# Patient Record
Sex: FEMALE | Race: WHITE | NOT HISPANIC OR LATINO | Employment: FULL TIME | URBAN - METROPOLITAN AREA
[De-identification: names, ages, dates, MRNs, and addresses within clinical notes are randomized per-mention and may not be internally consistent; named-entity substitution may affect disease eponyms.]

---

## 2018-11-27 ENCOUNTER — TRANSFERRED RECORDS (OUTPATIENT)
Dept: HEALTH INFORMATION MANAGEMENT | Facility: CLINIC | Age: 58
End: 2018-11-27

## 2018-11-29 ENCOUNTER — PRE VISIT (OUTPATIENT)
Dept: OPHTHALMOLOGY | Facility: CLINIC | Age: 58
End: 2018-11-29

## 2018-11-29 PROBLEM — N95.2 VAGINAL ATROPHY: Status: ACTIVE | Noted: 2017-10-29

## 2018-11-29 PROBLEM — L30.9 DERMATITIS: Status: ACTIVE | Noted: 2017-10-29

## 2018-11-29 PROBLEM — N95.1 MENOPAUSAL STATE: Status: ACTIVE | Noted: 2017-10-29

## 2018-11-29 NOTE — TELEPHONE ENCOUNTER
I have attempted to contact this patient by phone with the following results:   Chief Complaint   Patient presents with     Previsit     appt w/ Dr Chen     Droopy Right Upper Lid     worsening over the last few years, also worsens as day wears on     Tawny W. COA. OSC

## 2018-12-04 NOTE — TELEPHONE ENCOUNTER
December 4, 2018                 Pre-Visit Documentation: Jane Fowler is a 58 year old female    Chief Complaint   Patient presents with     Previsit     appt w/ Dr Chen     Droopy Right Upper Lid     worsening over the last few years, also worsens as day wears on           No current outpatient prescriptions on file.       Tawny CALVILLO COA. [facility]SC

## 2021-03-13 ENCOUNTER — HEALTH MAINTENANCE LETTER (OUTPATIENT)
Age: 61
End: 2021-03-13

## 2021-10-23 ENCOUNTER — HEALTH MAINTENANCE LETTER (OUTPATIENT)
Age: 61
End: 2021-10-23

## 2022-04-09 ENCOUNTER — HEALTH MAINTENANCE LETTER (OUTPATIENT)
Age: 62
End: 2022-04-09

## 2022-10-09 ENCOUNTER — HEALTH MAINTENANCE LETTER (OUTPATIENT)
Age: 62
End: 2022-10-09

## 2023-03-25 ENCOUNTER — HEALTH MAINTENANCE LETTER (OUTPATIENT)
Age: 63
End: 2023-03-25

## 2023-05-21 ENCOUNTER — HEALTH MAINTENANCE LETTER (OUTPATIENT)
Age: 63
End: 2023-05-21

## 2023-12-14 ENCOUNTER — TRANSCRIBE ORDERS (OUTPATIENT)
Dept: OTHER | Age: 63
End: 2023-12-14

## 2023-12-14 ENCOUNTER — PRE VISIT (OUTPATIENT)
Dept: ONCOLOGY | Facility: CLINIC | Age: 63
End: 2023-12-14
Payer: COMMERCIAL

## 2023-12-14 DIAGNOSIS — C50.919 BREAST CANCER (H): Primary | ICD-10-CM

## 2023-12-14 NOTE — TELEPHONE ENCOUNTER
Action Taken  Date/Description  TJ     WT from NPS December 14, 2023  4:00 PM   Call from Pt to schedule 2nd opinion for Dx Breast cancer (H) [C50.919]   Pt was Diagnosed about 1 1/2 months ago - in [facility] Breast, Lymphnodes and Spine  Records from [facility] are in [facility]  All imaging at [facility]   Bx at  [facility] ([facility])

## 2023-12-15 ENCOUNTER — PATIENT OUTREACH (OUTPATIENT)
Dept: ONCOLOGY | Facility: CLINIC | Age: 63
End: 2023-12-15
Payer: COMMERCIAL

## 2023-12-15 NOTE — PROGRESS NOTES
New Patient Oncology Nurse Navigator Note     Referring provider: Self Referral      Referring Clinic/Organization: [facility]     Referred to (specialty:) Medical Oncology     Requested provider (if applicable): Dr. Milan Fernandez     Date Referral Received: December 14, 2023     Evaluation for:  Breast cancer     Clinical History (per Nurse review of records provided):      EXAM:  MAMMO VIDA SCREENING BI   DATE: 11/1/2023 4:20 PM     COMPARISON: 8/13/2021, 1/15/2020 and 11/21/2018 mammograms   CLINICAL DATA: Screening study.     TECHNIQUE: Craniocaudal and oblique digital mammograms of both breasts were obtained. Tomosynthesis technique was also utilized. This study was evaluated with computer-aided detection software (CAD).     FINDINGS:   BREAST DENSITY: Scattered fibroglandular   There is a focal ill-defined asymmetric density with subjacent architectural distortion in the 7:00-8 o'clock position of the right breast, middle depth, measuring up to 1.4 cm. No suspicious microcalcifications are identified.     MPRESSION:   Focal ill-defined asymmetric 1.5 cm density with associated architectural distortion in the 7:00-8 o'clock position of the right breast, middle depth. Recommend correlation with a targeted right breast ultrasound and possible additional mammographic views for further evaluation.     FOLLOW-UP RECOMMENDATION: An ultrasound with possible additional views     EXAM:  US BREAST LIMITED RT DATE: 11/7/2023 8:28 AM     CLINICAL INFORMATION: 63-year-old female, abnormal screening mammogram.   COMPARISON: Screening mammogram 11/1/2023.     TECHNICAL INFORMATION:    Targeted ultrasound of the right breast, 7-9 o'clock positions and axilla.     INTERPRETATION:    Diagnostic sonographic images of the breast demonstrate an irregular hypoechoic shadowing region of architectural distortion at the 8 o'clock position 11 cm from the nipple which correlates with the mammographic  finding. The aggregate region of abnormal echogenicity and shadowing measures approximately 2.1 x 1.8 x 2.0 cm.     Diagnostic imaging of the right axilla demonstrates several enlarged lymph nodes with thickened cortices, cortical thickness measuring up to 8 mm.     IMPRESSION:   1. Irregular shadowing region of architectural distortion at the 8 o'clock position 11 cm from the nipple which correlates with the mammographic finding and is suspicious for malignancy. Ultrasound-guided core biopsy is recommended.   2. Morphologically abnormal right axillary lymph nodes also require ultrasound-guided core biopsy.     FOLLOW-UP RECOMMENDATION: An ultrasound core biopsy     EXAM: PET FDG SKULL BASE TO MID-THIGH DATE:  11/24/2023 10:38 AM     COMPARISON: None available   CLINICAL DATA: C50.511 Malignant neoplasm of lower-outer quadrant of right female breast - MALIGNANT NEOPLASM OF LOWER OUTER QUADRANT OF RIGHT FEMALE BREAST     TECHNIQUE: Following intravenous administration of 64-xbgsfn-7-deoxyglucose (FDG) and a standard uptake, the patient was imaged on a [employer] whole body PET and CT scanner.  Multiple bed position acquisitions were obtained by the PET scanner, and contiguous images were obtained by the CT scanner along the length of the patient's body from the top of the head to the  level of the mid thighs.     FDG dose: 7.7 millicuries administered intravenously.     BLOOD GLUCOSE: 87 mg/dl.   BMI: 32     FINDINGS:   Symmetric uptake in the pharynx.     Multiple hypermetabolic right axillary and retropectoral lymph nodes. Hypermetabolism in the lower outer quadrant of the right breast, corresponding to the biopsy site in the patient's known malignancy.     No mediastinal or hilar adenopathy. Vascular calcifications. No pericardial effusion or pleural effusion. Respiratory motion. Dependent opacities in both lungs are favored to be atelectasis.     No perceptible hepatic lesion. Normal pancreas and spleen. No calcified  gallstones. No adrenal mass. Symmetric kidneys. No extraluminal gas or drainable fluid collection. Variable uptake in the bowel. Colonic diverticulosis. No abdominal or pelvic adenopathy.     Solitary hypermetabolic focus in the T5 vertebral body. Left sixth rib laterally with focal hypermetabolism. No clear correlate by CT. No destructive bone lesion. Heterogeneous marrow uptake within the proximal femora. Degenerative changes. Probable reactive uptake near the feet and shoulders.     IMPRESSION:  1.  Hypermetabolism in the lower outer quadrant of the right breast, corresponding with the patient's known malignancy/recent biopsy.  2.  Metastatic adenopathy in the right axilla and right retropectoral space.  3.  Osseous metastasis within the upper thoracic spine. Possible additional metastasis in the left sixth rib.    EXAM: MRI BRAIN W/[facility] CON  DATE: 12/4/2023 2:29 PM     CLINICAL INFORMATION: C50.911 Malignant neoplasm of unspecified site of right female breast.. Staging.     TECHNIQUE:  Sagittal FLAIR T1, axial FLAIR, axial fat-saturated FSE T2, axial DWI and GRE, axial T1, post gadolinium axial and coronal T1.     Contrast: 9 ml of Gadavist IV.     COMPARISON: none     FINDINGS:  No focal area of restricted diffusion. No bleed, shift, mass, mass effect, or edema. Gray-white differentiation is preserved. There are a few subtle T2 hyperintensities in the periventricular and subcortical white matter. No acute calvarial abnormality. The visualized paranasal sinuses, mastoids and orbits appear unremarkable. Very mild age-related changes. Enhancement: No pathologic dural or parenchymal enhancement.     IMPRESSION:   1. No evidence of intracranial metastatic disease.   2.  Mild age-related involutional and presumed microangiopathic changes.   3.  No calvarial metastasis appreciated.     EXAM: MRI SPINE THORACIC W/[facility] CON  DATE: 12/4/2023 2:25 PM     CLINICAL DATA: , Area of concern in thoracic spine, already giving son  for same day brain., OTHER, C50.911 Malignant neoplasm of unspecified site of right female breast, Z17.0 Estrogen receptor positive status (ER+),     TECHNIQUE: Sagittal T1, T2, STIR, post gadolinium T1. Axial T1, T2 and post gadolinium T1. Contrast: 9 cc Gadavist IV     COMPARISON: 11/24/2023 PET/CT     FINDINGS:   Solitary lesion in T5 vertebral body measuring 7 mm. No other lesion identified. Mild degenerative changes. Central canal and foramina widely patent. No paraspinal soft tissue abnormality. Medial ribs unremarkable.     IMPRESSION:   1. Solitary 7 mm lesion at T5 suspicious for metastatic focus.     11/27/23 Consultation with Dr. Tong Degroot   Clinical stage IV (T2N1M1) ER/MT positive, HER2 negative (1+ by IHC) invasive ductal carcinoma of the right breast    12/11/23: Follow up with Dr. Degroot    TREATMENT:  1. 12/2023 to present: anastrozole/abemaciclib     Bone metastatic disease - Discussed referral to radiation for palliation and she has declined at the present time. Discussed use of zoledronic acid and clearance form given today. She reports need for possible root canal/crown, will hold off initiating bisphosphonate therapy until dental work completed.     Records Location: Care Everywhere and bookmarked      Records Needed: NA     Additional testing needed prior to consult: NA    Payor: [employer] / Plan: [employer] PEAK / Product Type: O /     December 15, 2023    Called patient to introduced myself and role as nurse navigator with [facility] Hematology/Oncology department and to inform them that we have received a self referral for a diagnosis of metastatic breast cancer.     Patient did not answer the phone so a detailed message was left for them including NPS number. Requested callback to speak to a  to set the consult date/time/location. Explained to patient in the message that they will receive a call from our new patient scheduling team (NPS number below, hours are  Monday - Friday 8am - 4:30 pm) in the next 1-2 business days to schedule the consultation. Encouraged them to call back with any questions.       Anay Stafford, RN, BSN  [facility] Hematology/Oncology Nurse Navigator  [phone]

## 2023-12-18 NOTE — TELEPHONE ENCOUNTER
Path Slides 2023  3:54 PM  TJ   Action Taken Slides [id number] from [facility] received and sent to 5th floor path lab for review.     RECORDS STATUS - BREAST    RECORDS REQUESTED FROM: [facility], [place]   DATE REQUESTED:    NOTES DETAILS STATUS   OFFICE NOTE from medical oncologist  - [facility] Dr. Tong Degroot: 23   OPERATIVE REPORT [facility] 16: Hysterectomy   MEDICATION LIST [facility]     PATHOLOGY REPORTS  (Tissue diagnosis, Stage, ER/NY percentage positive and intensity of staining, HER2 IHC, FISH, and all biopsies from breast and any distant metastasis)                 [facility], Report in CE/[facility] & received , sent to HIM for STAT upload, emailed to  Email  Slides requested   [employer] [facility] - Requested 23: [phone]    [facility] - Not requested per report  16: [id number]   GENONOMIC TESTING     TYPE:   (Next Generation Sequencing, including Foundation One testing, and Oncotype score) [facility] - Requested 23: [employer]   IMAGING (NEED IMAGES & REPORT)     MRI PACS [facility]  23   MAMMO PACS [facility]  23, 21, 1/15/20, 18, 10/31/17    [facility]  12/15/15, 12/11/15, 12/10/13, 10/23/12   ULTRASOUND PACS [facility]  23    [facility]  10/23/12   PET PACS [facility]  23   BRAIN MRI PACS [facility]  23

## 2023-12-26 ENCOUNTER — LAB REQUISITION (OUTPATIENT)
Dept: LAB | Facility: CLINIC | Age: 63
End: 2023-12-26
Payer: COMMERCIAL

## 2023-12-26 PROCEDURE — 88321 CONSLTJ&REPRT SLD PREP ELSWR: CPT | Performed by: STUDENT IN AN ORGANIZED HEALTH CARE EDUCATION/TRAINING PROGRAM

## 2023-12-28 LAB
PATH REPORT.COMMENTS IMP SPEC: ABNORMAL
PATH REPORT.COMMENTS IMP SPEC: YES
PATH REPORT.FINAL DX SPEC: ABNORMAL
PATH REPORT.GROSS SPEC: ABNORMAL
PATH REPORT.MICROSCOPIC SPEC OTHER STN: ABNORMAL
PATH REPORT.RELEVANT HX SPEC: ABNORMAL
PATH REPORT.RELEVANT HX SPEC: ABNORMAL
PATH REPORT.SITE OF ORIGIN SPEC: ABNORMAL

## 2024-01-16 ENCOUNTER — PRE VISIT (OUTPATIENT)
Dept: ONCOLOGY | Facility: CLINIC | Age: 64
End: 2024-01-16

## 2024-01-16 ENCOUNTER — ONCOLOGY VISIT (OUTPATIENT)
Dept: ONCOLOGY | Facility: CLINIC | Age: 64
End: 2024-01-16
Attending: INTERNAL MEDICINE
Payer: COMMERCIAL

## 2024-01-16 VITALS
BODY MASS INDEX: 30.53 KG/M2 | DIASTOLIC BLOOD PRESSURE: 70 MMHG | HEIGHT: 67 IN | OXYGEN SATURATION: 98 % | SYSTOLIC BLOOD PRESSURE: 115 MMHG | HEART RATE: 82 BPM | WEIGHT: 194.5 LBS | RESPIRATION RATE: 16 BRPM

## 2024-01-16 DIAGNOSIS — C79.51 BREAST CANCER METASTASIZED TO BONE, RIGHT (H): ICD-10-CM

## 2024-01-16 DIAGNOSIS — C50.911 BREAST CANCER METASTASIZED TO AXILLARY LYMPH NODE, RIGHT (H): ICD-10-CM

## 2024-01-16 DIAGNOSIS — C77.3 BREAST CANCER METASTASIZED TO AXILLARY LYMPH NODE, RIGHT (H): ICD-10-CM

## 2024-01-16 DIAGNOSIS — C50.811 MALIGNANT NEOPLASM OF OVERLAPPING SITES OF RIGHT BREAST IN FEMALE, ESTROGEN RECEPTOR POSITIVE (H): Primary | ICD-10-CM

## 2024-01-16 DIAGNOSIS — C50.911 BREAST CANCER METASTASIZED TO BONE, RIGHT (H): ICD-10-CM

## 2024-01-16 DIAGNOSIS — Z17.0 MALIGNANT NEOPLASM OF OVERLAPPING SITES OF RIGHT BREAST IN FEMALE, ESTROGEN RECEPTOR POSITIVE (H): Primary | ICD-10-CM

## 2024-01-16 PROCEDURE — 99205 OFFICE O/P NEW HI 60 MIN: CPT | Performed by: INTERNAL MEDICINE

## 2024-01-16 PROCEDURE — 99213 OFFICE O/P EST LOW 20 MIN: CPT | Performed by: INTERNAL MEDICINE

## 2024-01-16 RX ORDER — ANASTROZOLE 1 MG/1
1 TABLET ORAL DAILY
COMMUNITY
Start: 2023-11-27

## 2024-01-16 ASSESSMENT — PAIN SCALES - GENERAL: PAINLEVEL: NO PAIN (0)

## 2024-01-16 NOTE — LETTER
"    2024         RE: Jane Fowler  [address] [place] [address]        Dear Colleague,    Thank you for referring your patient, Jane Fowler, to the [facility]. Please see a copy of my visit note below.    [facility] Hematology / Oncology  Initial Visit / Consultation Note 2024  Name: Jane Fowler  :  1960  MRN:  [id number]    --------------------    Assessment / Plan:  Stage IV, hormone-positive, HER2-negative breast cancer w/ oligometastatic bone met to vertebrae.    Agree w/ plans for Verzenio / AI x 3 months; reviewed cytopenias, diarrhea, ILD.  Agree w/ current dosing, but could consider dose reduction if diarrhea persists.  Would recommend PET scan at conclusion of 3 months given bone met.  Reviewed role of bone strengthening agents w/ Zometa and need for dental clearance.  Would recommend NGS testing including ESR1 mutation if not planned for future therapy options.  Spent considerable time reviewing \"just how aggressive to be\" given oligometastatic disease.  If Verzenio achieves CR, could consider continuing current therapy vs surgical resection, axillary radiation and SBRT to bone met; reviewed selection bias w/ trials in similar scenarios.  Offered follow-up, but reviewed that I would support Dr. Degroot's plans.    Thank you kindly for this consultation.  Milan Fernandez MD    --------------------    Interval History:  Jane present for evaluation of newly diagnosed low stage IV breast cancer.  She was in her usual state of health when she underwent a screening mammogram that revealed an ill-defined asymmetric 1-1/2 cm density at the 7 to 8 o'clock position of the right breast.  Follow-up right breast ultrasound revealed an irregular shadowing of architectural distortion at the 8 o'clock position 11 cm from the nipple correlating with the mammographic finding suspicious for malignancy as well as morphologically abnormal right " axillary nodes.  Right breast biopsy at the 8 o'clock position revealed grade 2 invasive ductal carcinoma ER +91 to 100%, AR +21 to 30%, HER2 negative/low.  Thelma biopsy also revealed invasive ductal carcinoma.  Staging PET scan revealed hypermetabolic lower outer quadrant right breast activity as well as metastatic adenopathy in the right axilla and right retropectoral space as well as a possible osseous metastatic disease in the upper thoracic spine and left sixth rib.  Brain MRI revealed no evidence of intracranial metastatic disease.  MRI thoracic spine revealed a solitary 7 mm lesion at the T5 position suspicious for metastatic focus.  She is otherwise well and without complaints.  This comes as a shock to her.  She is accompanied by her family.  She is presents here a second opinion for medical recommendations.    --------------------    Review of Systems:  10 point ROS negative except for that above.    Past Medical / Surgical History:  Past Medical History:   Diagnosis Date     Dermatitis 11/29/2013     Elevated LDL cholesterol level 11/03/2014     Endometrial polyp 01/11/2016     Exostosis of bone of foot 12/09/2016    left     Fibroid uterus 11/08/2011     Fibroids, intramural 11/08/2011     Menopausal state 10/29/2017     Menorrhagia 10/12/2005     Nonsenile cataract      PONV (postoperative nausea and vomiting)      Umbilical hernia without obstruction and without gangrene 02/07/2016     Past Surgical History:   Procedure Laterality Date     IR LYMPH NODE BIOPSY  11/7/2023     LAPAROSCOPIC HYSTERECTOMY TOTAL, BILATERAL SALPINGO-OOPHORECTOMY, COMBINED  02/08/2016    Dr Gómez Meehan     Patient Active Problem List   Diagnosis     BMI 29.0-29.9,adult     Dermatitis     Elevated LDL cholesterol level     Exostosis of bone of foot     Menopausal state     Myopia     Presbyopia     Regular astigmatism     Vaginal atrophy       Family History:  Family History   Problem Relation Age of Onset     Cataracts Mother  "     Uterine Cancer Mother      Glaucoma Mother      Bone Cancer Father      Other - See Comments Father         multiple myeloma     Heart Disease Maternal Grandmother      Bone Cancer Paternal Grandfather      Heart Disease Brother      Other - See Comments Sister         Diverticulitis     Other - See Comments Daughter         Polycystic ovary syndrome     Diabetes No family hx of      Hypertension No family hx of      Thyroid Disease No family hx of      Macular Degeneration No family hx of      Cancer No family hx of        Social History:  Social History     Tobacco Use     Smoking status: Never     Smokeless tobacco: Never   Substance Use Topics     Alcohol use: Yes     Comment: socially     Drug use: No       Medications / Allergies:  Reviewed in EMR.    --------------------    Physical Exam:  VS: /70 (BP Location: Right arm)   Pulse 82   Resp 16   Ht 1.689 m (5' 6.5\")   Wt 88.2 kg (194 lb 8 oz)   SpO2 98%   BMI 30.92 kg/m    GEN: Well appearing.    Labs / Imaging / Path:  Reviewed surgical pathology report, CBC, CMP.  Reviewed PET w/ independent review.    Again, thank you for allowing me to participate in the care of your patient.        Sincerely,        Milan Fernandez MD  "

## 2024-01-16 NOTE — NURSING NOTE
"Oncology Rooming Note    January 16, 2024 11:16 AM   Jane Fowler is a 63 year old female who presents for:    Chief Complaint   Patient presents with    Oncology Clinic Visit     New patient- breast cancer     Initial Vitals: /70 (BP Location: Right arm)   Pulse 82   Resp 16   Ht 1.689 m (5' 6.5\")   Wt 88.2 kg (194 lb 8 oz)   SpO2 98%   BMI 30.92 kg/m   Estimated body mass index is 30.92 kg/m  as calculated from the following:    Height as of this encounter: 1.689 m (5' 6.5\").    Weight as of this encounter: 88.2 kg (194 lb 8 oz). Body surface area is 2.03 meters squared.  No Pain (0) Comment: Data Unavailable   No LMP recorded. Patient has had a hysterectomy.  Allergies reviewed: Yes  Medications reviewed: Yes    Medications: Medication refills not needed today.  Pharmacy name entered into [facility] [facility]: [id number] - [place], [place] - [address]    Frailty Screening:   Is the patient here for a new oncology consult visit in cancer care? 1. Yes. Over the past month, have you experienced difficulty or required a caregiver to assist with:   1. Balance, walking or general mobility (including any falls)? NO   2. Completion of self-care tasks such as bathing, dressing, toileting, grooming/hygiene?  NO  3. Concentration or memory that affects your daily life?  NO       Clinical concerns: New patient- breast cancer       Roberta Spears LPN              "

## 2024-01-22 NOTE — PROGRESS NOTES
"[facility] Hematology / Oncology  Initial Visit / Consultation Note 2024  Name: Jane Fowler  :  1960  MRN:  [id number]    --------------------    Assessment / Plan:  Stage IV, hormone-positive, HER2-negative breast cancer w/ oligometastatic bone met to vertebrae.    Agree w/ plans for Verzenio / AI x 3 months; reviewed cytopenias, diarrhea, ILD.  Agree w/ current dosing, but could consider dose reduction if diarrhea persists.  Would recommend PET scan at conclusion of 3 months given bone met.  Reviewed role of bone strengthening agents w/ Zometa and need for dental clearance.  Would recommend NGS testing including ESR1 mutation if not planned for future therapy options.  Spent considerable time reviewing \"just how aggressive to be\" given oligometastatic disease.  If Verzenio achieves CR, could consider continuing current therapy vs surgical resection, axillary radiation and SBRT to bone met; reviewed selection bias w/ trials in similar scenarios.  Offered follow-up, but reviewed that I would support Dr. Degroot's plans.    Thank you kindly for this consultation.  Milan Fernandez MD    --------------------    Interval History:  Jane present for evaluation of newly diagnosed low stage IV breast cancer.  She was in her usual state of health when she underwent a screening mammogram that revealed an ill-defined asymmetric 1-1/2 cm density at the 7 to 8 o'clock position of the right breast.  Follow-up right breast ultrasound revealed an irregular shadowing of architectural distortion at the 8 o'clock position 11 cm from the nipple correlating with the mammographic finding suspicious for malignancy as well as morphologically abnormal right axillary nodes.  Right breast biopsy at the 8 o'clock position revealed grade 2 invasive ductal carcinoma ER +91 to 100%, OH +21 to 30%, HER2 negative/low.  Thelma biopsy also revealed invasive ductal carcinoma.  Staging PET scan revealed hypermetabolic " lower outer quadrant right breast activity as well as metastatic adenopathy in the right axilla and right retropectoral space as well as a possible osseous metastatic disease in the upper thoracic spine and left sixth rib.  Brain MRI revealed no evidence of intracranial metastatic disease.  MRI thoracic spine revealed a solitary 7 mm lesion at the T5 position suspicious for metastatic focus.  She is otherwise well and without complaints.  This comes as a shock to her.  She is accompanied by her family.  She is presents here a second opinion for medical recommendations.    --------------------    Review of Systems:  10 point ROS negative except for that above.    Past Medical / Surgical History:  Past Medical History:   Diagnosis Date    Dermatitis 11/29/2013    Elevated LDL cholesterol level 11/03/2014    Endometrial polyp 01/11/2016    Exostosis of bone of foot 12/09/2016    left    Fibroid uterus 11/08/2011    Fibroids, intramural 11/08/2011    Menopausal state 10/29/2017    Menorrhagia 10/12/2005    Nonsenile cataract     PONV (postoperative nausea and vomiting)     Umbilical hernia without obstruction and without gangrene 02/07/2016     Past Surgical History:   Procedure Laterality Date    IR LYMPH NODE BIOPSY  11/7/2023    LAPAROSCOPIC HYSTERECTOMY TOTAL, BILATERAL SALPINGO-OOPHORECTOMY, COMBINED  02/08/2016    Dr Gómez Meehan     Patient Active Problem List   Diagnosis    BMI 29.0-29.9,adult    Dermatitis    Elevated LDL cholesterol level    Exostosis of bone of foot    Menopausal state    Myopia    Presbyopia    Regular astigmatism    Vaginal atrophy       Family History:  Family History   Problem Relation Age of Onset    Cataracts Mother     Uterine Cancer Mother     Glaucoma Mother     Bone Cancer Father     Other - See Comments Father         multiple myeloma    Heart Disease Maternal Grandmother     Bone Cancer Paternal Grandfather     Heart Disease Brother     Other - See Comments Sister          "Diverticulitis    Other - See Comments Daughter         Polycystic ovary syndrome    Diabetes No family hx of     Hypertension No family hx of     Thyroid Disease No family hx of     Macular Degeneration No family hx of     Cancer No family hx of        Social History:  Social History     Tobacco Use    Smoking status: Never    Smokeless tobacco: Never   Substance Use Topics    Alcohol use: Yes     Comment: socially    Drug use: No       Medications / Allergies:  Reviewed in EMR.    --------------------    Physical Exam:  VS: /70 (BP Location: Right arm)   Pulse 82   Resp 16   Ht 1.689 m (5' 6.5\")   Wt 88.2 kg (194 lb 8 oz)   SpO2 98%   BMI 30.92 kg/m    GEN: Well appearing.    Labs / Imaging / Path:  Reviewed surgical pathology report, CBC, CMP.  Reviewed PET w/ independent review.  "

## 2024-12-21 ENCOUNTER — HEALTH MAINTENANCE LETTER (OUTPATIENT)
Age: 64
End: 2024-12-21